# Patient Record
Sex: FEMALE | Race: WHITE
[De-identification: names, ages, dates, MRNs, and addresses within clinical notes are randomized per-mention and may not be internally consistent; named-entity substitution may affect disease eponyms.]

---

## 2021-12-19 ENCOUNTER — HOSPITAL ENCOUNTER (EMERGENCY)
Dept: HOSPITAL 94 - ER | Age: 46
Discharge: HOME | End: 2021-12-19
Payer: MEDICARE

## 2021-12-19 VITALS — DIASTOLIC BLOOD PRESSURE: 87 MMHG | SYSTOLIC BLOOD PRESSURE: 154 MMHG

## 2021-12-19 VITALS — BODY MASS INDEX: 20.52 KG/M2 | WEIGHT: 143.3 LBS | HEIGHT: 70 IN

## 2021-12-19 DIAGNOSIS — F32.9: ICD-10-CM

## 2021-12-19 DIAGNOSIS — Z88.8: ICD-10-CM

## 2021-12-19 DIAGNOSIS — Z79.899: ICD-10-CM

## 2021-12-19 DIAGNOSIS — F43.12: Primary | ICD-10-CM

## 2021-12-19 DIAGNOSIS — F12.90: ICD-10-CM

## 2021-12-19 PROCEDURE — 99283 EMERGENCY DEPT VISIT LOW MDM: CPT

## 2021-12-27 ENCOUNTER — HOSPITAL ENCOUNTER (EMERGENCY)
Dept: HOSPITAL 94 - ER | Age: 46
Discharge: HOME | End: 2021-12-27
Payer: MEDICARE

## 2021-12-27 VITALS — HEIGHT: 70 IN | BODY MASS INDEX: 21.27 KG/M2 | WEIGHT: 148.59 LBS

## 2021-12-27 VITALS — SYSTOLIC BLOOD PRESSURE: 107 MMHG | DIASTOLIC BLOOD PRESSURE: 75 MMHG

## 2021-12-27 DIAGNOSIS — F43.10: ICD-10-CM

## 2021-12-27 DIAGNOSIS — Z79.899: ICD-10-CM

## 2021-12-27 DIAGNOSIS — F43.12: Primary | ICD-10-CM

## 2021-12-27 DIAGNOSIS — Z88.8: ICD-10-CM

## 2021-12-27 DIAGNOSIS — F32.9: ICD-10-CM

## 2021-12-27 DIAGNOSIS — F12.90: ICD-10-CM

## 2021-12-27 LAB
AMPHETAMINES UR QL SCN: NEGATIVE
BARBITURATES UR QL SCN: NEGATIVE
BENZODIAZ UR QL SCN: NEGATIVE
BZE UR QL SCN: NEGATIVE
CANNABINOIDS UR QL SCN: POSITIVE
CLARITY UR: CLEAR
COLOR UR: YELLOW
GLUCOSE UR STRIP-MCNC: NEGATIVE MG/DL
HCG UR QL: NEGATIVE
HGB UR QL STRIP: NEGATIVE
KETONES UR STRIP-MCNC: (no result) MG/DL
LEUKOCYTE ESTERASE UR QL STRIP: NEGATIVE
METHADONE UR QL SCN: NEGATIVE
NITRITE UR QL STRIP: NEGATIVE
OPIATES UR QL SCN: NEGATIVE
PCP UR QL SCN: NEGATIVE
PH UR STRIP: 7 [PH] (ref 4.8–8)
PROT UR QL STRIP: NEGATIVE MG/DL
SP GR UR STRIP: 1.01 (ref 1–1.03)
URN COLLECT METHOD CLASS: (no result)
UROBILINOGEN UR STRIP-MCNC: 0.2 E.U/DL (ref 0.2–1)

## 2021-12-27 PROCEDURE — 80305 DRUG TEST PRSMV DIR OPT OBS: CPT

## 2021-12-27 PROCEDURE — 99283 EMERGENCY DEPT VISIT LOW MDM: CPT

## 2021-12-27 PROCEDURE — 81003 URINALYSIS AUTO W/O SCOPE: CPT

## 2021-12-27 PROCEDURE — 81025 URINE PREGNANCY TEST: CPT

## 2021-12-27 NOTE — NUR
Patient reports PTSD exacerbation. Patient states she dropped off a 
prescription for Zoloft at [facility] pharmacy last week and is "too afraid" to 
 prescription stating there are "4x4 trucks revving engines and shining 
high beams" at her.  When asked how we can help in ER, patient stated "just 
talking to someone helps."  Patient given phone to call family.

## 2021-12-28 ENCOUNTER — HOSPITAL ENCOUNTER (EMERGENCY)
Dept: HOSPITAL 94 - ER | Age: 46
Discharge: LEFT BEFORE DISCHARGE | End: 2021-12-28
Payer: MEDICARE

## 2021-12-28 VITALS — HEIGHT: 70 IN | WEIGHT: 150.31 LBS | BODY MASS INDEX: 21.52 KG/M2

## 2021-12-28 DIAGNOSIS — Z79.899: ICD-10-CM

## 2021-12-28 DIAGNOSIS — F32.9: ICD-10-CM

## 2021-12-28 DIAGNOSIS — F12.90: ICD-10-CM

## 2021-12-28 DIAGNOSIS — Z88.8: ICD-10-CM

## 2021-12-28 DIAGNOSIS — F43.12: Primary | ICD-10-CM

## 2021-12-28 PROCEDURE — 99281 EMR DPT VST MAYX REQ PHY/QHP: CPT
